# Patient Record
(demographics unavailable — no encounter records)

---

## 2024-11-19 NOTE — DISCUSSION/SUMMARY
[de-identified] : Bilateral knee osteoarthritis right more symptomatic than left I discussed the treatment of degenerative arthritis with the patient at length today. I described the spectrum of treatment from nonoperative modalities to total joint arthroplasty. Noninvasive and nonoperative treatment modalities include weight reduction, activity modification with low impact exercise, PRN use of acetaminophen or anti-inflammatory medication if tolerated, glucosamine/chondroitin supplements, and physical therapy. Further treatments can include corticosteroid injection and the use of hyaluronic acid injections. Definitive treatment can certainly include total joint arthroplasty also. The risks and benefits of each treatment options was discussed and all questions were answered.  The patient is indicated for total knee arthroplasty beginning with the more symptomatic right knee . We discussed the surgery postoperative protocol restrictions in length. Risks benefits alternatives of surgery discussed including but not limited to risks such as bleeding infection nerve and vessel injury continued pain stiffness need for future surgery medical complications such as DVT or PE and anesthesia complications. We reviewed the plan of care and used a model of a knee replacement that will be be used for the joint replacement. We did discuss implant choice and fixation method with shared decision-making with myself and the patient. We discussed the use of cement fixation. We discussed discharge options and plan. The patient agrees with this plan of care as well these implants other total knee replacement.

## 2024-11-19 NOTE — PHYSICAL EXAM
[de-identified] : General Exam   Well developed, well nourished No apparent distress Oriented to person, place, and time Mood: Normal Affect: Normal Balance and coordination: Normal Gait: Normal  right knee exam   Skin: Clean, dry, intact Inspection: No obvious malalignment, no masses, no swelling, no effusion. Tenderness: ++ MJLT. No LJLT. No tenderness over the medial and lateral patella facets. No ttp medial/lateral epicondyle, patella tendon, tibial tubercle, pes anserinus, or proximal fibula. ROM: 0 to 140 no pain with deep flexion Stability: Stable to varus, valgus, lachman testing. Negative anterior/posterior drawer. Additional tests: Negative McMurrays test, Negative patellar grind test. Strength: 5/5 Q/H/TA/GS/EHL, no atrophy Neuro: In tact to light touch throughout in dp/sp/tib/lady/saph nerve districutions, DTR's normal Pulses: 2+ DP/PT pulses.  left knee exam skin: Clean, dry, intact Inspection: No obvious malalignment, no masses, no swelling, no effusion. Tenderness: ++ MJLT. No LJLT. No tenderness over the medial and lateral patella facets. No ttp medial/lateral epicondyle, patella tendon, tibial tubercle, pes anserinus, or proximal fibula. ROM: 0 to 140 no pain with deep flexion Stability: Stable to varus, valgus, lachman testing. Negative anterior/posterior drawer. Additional tests: Negative McMurrays test, Negative patellar grind test. Strength: 5/5 Q/H/TA/GS/EHL, no atrophy Neuro: In tact to light touch throughout in dp/sp/tib/lady/saph nerve districutions, DTR's normal Pulses: 2+ DP/PT pulses. [de-identified] : The following radiographs were ordered and read by me during this patients visit. I reviewed each radiograph in detail with the patient and discussed the findings as highlighted below.  4 views both knees were obtained today severe osteoarthritis complete loss of joint space osteophyte sclerosis

## 2024-11-19 NOTE — HISTORY OF PRESENT ILLNESS
[de-identified] :  63-year-old female longstanding bilateral knee pain right worse than left.  Reports pain for many years has had extensive conservative care with physical therapy medications injections.  She was scheduled to have knee replacement done by an outside provider however there were issues in terms of the provider leaving the practice.  She is looking for a new provider.  She is pain on daily basis worse with walking.  She is interested in a right knee replacement The patient's past medical history, past surgical history, medications, allergies, and social history were reviewed by me today with the patient and documented accordingly. In addition, the patient's family history, which is noncontributory to this visit, was also reviewed.

## 2025-01-28 NOTE — HISTORY OF PRESENT ILLNESS
[de-identified] : Right Knee [de-identified] : 63 yo female s/p Right total knee arthroplasty, 01/13/2025 she is overall doing well pain is controlled walking with a cane was discharged to subacute rehab yesterday   [de-identified] : Right knee exam Incisions are healing well no erythema Mild effusion Range of motion 0-95 stable to anterior posterior varus and valgus stress calf is soft and nontender Able to flex and extend all toes Sensation intact throughout brisk capillary refill. [de-identified] : Right total knee arthroplasty, 01/13/2025 [de-identified] : Post operative protocol and restrictions discussed. Physical therapy, home exercises and stretches, continue current pain regimen as needed. All questions answered.  Continue DVT prophylaxis FU in ___4 weeks___

## 2025-03-07 NOTE — HISTORY OF PRESENT ILLNESS
[de-identified] : Right Knee [de-identified] : 63 yo female s/p Right total knee arthroplasty, 01/13/2025 she is overall doing well pain is controlled she is walk without assistive device happy with her progress working with physical therapy [de-identified] : Right knee exam Incisions are healing well no erythema Mild effusion Range of motion 0-110 stable to anterior posterior varus and valgus stress calf is soft and nontender Able to flex and extend all toes Sensation intact throughout brisk capillary refill. [de-identified] : The following radiographs were ordered and read by me during this patients visit. I reviewed each radiograph in detail with the patient and discussed the findings as highlighted below. 3 views right knee were obtained today.  Well-positioned total knee replacement no evidence of loosening or fracture [de-identified] : Post operative protocol and restrictions discussed. Physical therapy, home exercises and stretches, continue current pain regimen as needed. All questions answered.  Continue DVT prophylaxis FU in ___6 weeks___ [de-identified] : Right total knee arthroplasty, 01/13/2025

## 2025-05-02 NOTE — DISCUSSION/SUMMARY
[de-identified] : 65 yo female s/p Right total knee arthroplasty, 01/13/2025 she is overall doing well she has minimal pain she is returned to activity she is not physical therapy is happy with her progress she will follow-up for an annual checkup

## 2025-05-02 NOTE — HISTORY OF PRESENT ILLNESS
[de-identified] : 63 yo female s/p Right total knee arthroplasty, 01/13/2025 She is overall doing well her pain is controlled she is working with therapy she is happy with her progress

## 2025-05-02 NOTE — PHYSICAL EXAM
[de-identified] : Right knee exam Incisions are healing well no erythema Mild effusion Range of motion 0-120 stable to anterior posterior varus and valgus stress calf is soft and nontender Able to flex and extend all toes Sensation intact throughout brisk capillary refill.

## 2025-06-18 NOTE — HISTORY OF PRESENT ILLNESS
[de-identified] : 63 yo female follows up today with a new issue of left knee pain. Over the weekend she states she was helping a friend move out of their apartment and was navigating a lot of stairs carrying heavy boxes. She states her pain began on Monday with her left knee on the medial aspect of her knee. She states she had pain and difficulty fully ranging her knee.  It has gotten better since Monday but she is concerned about her knee.  She does not recall any trauma or injury to that knee. She denies any numbness or tingling. of note she is  s/p Right total knee arthroplasty, 01/13/2025 She is overall doing well her pain is controlled she is working with therapy she is happy with her progress

## 2025-06-18 NOTE — PHYSICAL EXAM
[de-identified] : left knee exam   Skin: Clean, dry, intact Inspection: No obvious malalignment, no masses, no swelling, no effusion. Tenderness: + MJLT. No LJLT. No tenderness over the medial and lateral patella facets. No ttp medial/lateral epicondyle, patella tendon, tibial tubercle, pes anserinus, or proximal fibula. ROM: 0 to 140 no pain with deep flexion Stability: Stable to varus, valgus, lachman testing. Negative anterior/posterior drawer. Additional tests: Negative McMurrays test, Negative patellar grind test. Strength: 5/5 Q/H/TA/GS/EHL, no atrophy Neuro: In tact to light touch throughout in dp/sp/tib/lady/saph nerve districutions, DTR's normal Pulses: 2+ DP/PT pulses.  Right knee exam Incisions are healing well no erythema Mild effusion Range of motion 0-120 stable to anterior posterior varus and valgus stress calf is soft and nontender Able to flex and extend all toes Sensation intact throughout brisk capillary refill.